# Patient Record
Sex: FEMALE | Race: OTHER | HISPANIC OR LATINO | URBAN - METROPOLITAN AREA
[De-identification: names, ages, dates, MRNs, and addresses within clinical notes are randomized per-mention and may not be internally consistent; named-entity substitution may affect disease eponyms.]

---

## 2022-07-13 ENCOUNTER — INPATIENT (INPATIENT)
Facility: HOSPITAL | Age: 22
LOS: 0 days | Discharge: HOME | DRG: 770 | End: 2022-07-13
Attending: OBSTETRICS & GYNECOLOGY | Admitting: OBSTETRICS & GYNECOLOGY
Payer: MEDICAID

## 2022-07-13 VITALS
OXYGEN SATURATION: 98 % | TEMPERATURE: 98 F | HEART RATE: 65 BPM | SYSTOLIC BLOOD PRESSURE: 114 MMHG | RESPIRATION RATE: 13 BRPM | DIASTOLIC BLOOD PRESSURE: 74 MMHG

## 2022-07-13 VITALS
DIASTOLIC BLOOD PRESSURE: 69 MMHG | SYSTOLIC BLOOD PRESSURE: 121 MMHG | OXYGEN SATURATION: 99 % | RESPIRATION RATE: 18 BRPM | HEIGHT: 65 IN | WEIGHT: 149.91 LBS | TEMPERATURE: 98 F | HEART RATE: 85 BPM

## 2022-07-13 DIAGNOSIS — O03.4 INCOMPLETE SPONTANEOUS ABORTION WITHOUT COMPLICATION: ICD-10-CM

## 2022-07-13 DIAGNOSIS — Z98.890 OTHER SPECIFIED POSTPROCEDURAL STATES: Chronic | ICD-10-CM

## 2022-07-13 LAB
ABO RH CONFIRMATION: SIGNIFICANT CHANGE UP
ALBUMIN SERPL ELPH-MCNC: 3.5 G/DL — SIGNIFICANT CHANGE UP (ref 3.3–5)
ALP SERPL-CCNC: 78 U/L — SIGNIFICANT CHANGE UP (ref 40–120)
ALT FLD-CCNC: 38 U/L — SIGNIFICANT CHANGE UP (ref 12–78)
ANION GAP SERPL CALC-SCNC: 5 MMOL/L — SIGNIFICANT CHANGE UP (ref 5–17)
APTT BLD: 29.9 SEC — SIGNIFICANT CHANGE UP (ref 27.5–35.5)
AST SERPL-CCNC: 15 U/L — SIGNIFICANT CHANGE UP (ref 15–37)
BASOPHILS # BLD AUTO: 0.03 K/UL — SIGNIFICANT CHANGE UP (ref 0–0.2)
BASOPHILS NFR BLD AUTO: 0.5 % — SIGNIFICANT CHANGE UP (ref 0–2)
BILIRUB SERPL-MCNC: 0.3 MG/DL — SIGNIFICANT CHANGE UP (ref 0.2–1.2)
BLD GP AB SCN SERPL QL: SIGNIFICANT CHANGE UP
BUN SERPL-MCNC: 12 MG/DL — SIGNIFICANT CHANGE UP (ref 7–23)
CALCIUM SERPL-MCNC: 9.1 MG/DL — SIGNIFICANT CHANGE UP (ref 8.5–10.1)
CHLORIDE SERPL-SCNC: 108 MMOL/L — SIGNIFICANT CHANGE UP (ref 96–108)
CO2 SERPL-SCNC: 27 MMOL/L — SIGNIFICANT CHANGE UP (ref 22–31)
CREAT SERPL-MCNC: 0.8 MG/DL — SIGNIFICANT CHANGE UP (ref 0.5–1.3)
EGFR: 109 ML/MIN/1.73M2 — SIGNIFICANT CHANGE UP
EOSINOPHIL # BLD AUTO: 0.2 K/UL — SIGNIFICANT CHANGE UP (ref 0–0.5)
EOSINOPHIL NFR BLD AUTO: 3.3 % — SIGNIFICANT CHANGE UP (ref 0–6)
GLUCOSE SERPL-MCNC: 110 MG/DL — HIGH (ref 70–99)
HCG SERPL-ACNC: 394 MIU/ML — HIGH
HCT VFR BLD CALC: 32.9 % — LOW (ref 34.5–45)
HGB BLD-MCNC: 11.4 G/DL — LOW (ref 11.5–15.5)
IMM GRANULOCYTES NFR BLD AUTO: 0.2 % — SIGNIFICANT CHANGE UP (ref 0–1.5)
INR BLD: 1.05 RATIO — SIGNIFICANT CHANGE UP (ref 0.88–1.16)
LIDOCAIN IGE QN: 63 U/L — LOW (ref 73–393)
LYMPHOCYTES # BLD AUTO: 2.88 K/UL — SIGNIFICANT CHANGE UP (ref 1–3.3)
LYMPHOCYTES # BLD AUTO: 47 % — HIGH (ref 13–44)
MCHC RBC-ENTMCNC: 29.8 PG — SIGNIFICANT CHANGE UP (ref 27–34)
MCHC RBC-ENTMCNC: 34.7 GM/DL — SIGNIFICANT CHANGE UP (ref 32–36)
MCV RBC AUTO: 86.1 FL — SIGNIFICANT CHANGE UP (ref 80–100)
MONOCYTES # BLD AUTO: 0.35 K/UL — SIGNIFICANT CHANGE UP (ref 0–0.9)
MONOCYTES NFR BLD AUTO: 5.7 % — SIGNIFICANT CHANGE UP (ref 2–14)
NEUTROPHILS # BLD AUTO: 2.66 K/UL — SIGNIFICANT CHANGE UP (ref 1.8–7.4)
NEUTROPHILS NFR BLD AUTO: 43.3 % — SIGNIFICANT CHANGE UP (ref 43–77)
NRBC # BLD: 0 /100 WBCS — SIGNIFICANT CHANGE UP (ref 0–0)
PLATELET # BLD AUTO: 317 K/UL — SIGNIFICANT CHANGE UP (ref 150–400)
POTASSIUM SERPL-MCNC: 3.8 MMOL/L — SIGNIFICANT CHANGE UP (ref 3.5–5.3)
POTASSIUM SERPL-SCNC: 3.8 MMOL/L — SIGNIFICANT CHANGE UP (ref 3.5–5.3)
PROT SERPL-MCNC: 6.8 G/DL — SIGNIFICANT CHANGE UP (ref 6–8.3)
PROTHROM AB SERPL-ACNC: 12.3 SEC — SIGNIFICANT CHANGE UP (ref 10.5–13.4)
RBC # BLD: 3.82 M/UL — SIGNIFICANT CHANGE UP (ref 3.8–5.2)
RBC # FLD: 12.7 % — SIGNIFICANT CHANGE UP (ref 10.3–14.5)
SARS-COV-2 RNA SPEC QL NAA+PROBE: SIGNIFICANT CHANGE UP
SODIUM SERPL-SCNC: 140 MMOL/L — SIGNIFICANT CHANGE UP (ref 135–145)
WBC # BLD: 6.13 K/UL — SIGNIFICANT CHANGE UP (ref 3.8–10.5)
WBC # FLD AUTO: 6.13 K/UL — SIGNIFICANT CHANGE UP (ref 3.8–10.5)

## 2022-07-13 PROCEDURE — 85025 COMPLETE CBC W/AUTO DIFF WBC: CPT

## 2022-07-13 PROCEDURE — 99285 EMERGENCY DEPT VISIT HI MDM: CPT

## 2022-07-13 PROCEDURE — 84702 CHORIONIC GONADOTROPIN TEST: CPT

## 2022-07-13 PROCEDURE — 88304 TISSUE EXAM BY PATHOLOGIST: CPT

## 2022-07-13 PROCEDURE — 85610 PROTHROMBIN TIME: CPT

## 2022-07-13 PROCEDURE — U0005: CPT

## 2022-07-13 PROCEDURE — 96360 HYDRATION IV INFUSION INIT: CPT

## 2022-07-13 PROCEDURE — 85730 THROMBOPLASTIN TIME PARTIAL: CPT

## 2022-07-13 PROCEDURE — 86900 BLOOD TYPING SEROLOGIC ABO: CPT

## 2022-07-13 PROCEDURE — 83690 ASSAY OF LIPASE: CPT

## 2022-07-13 PROCEDURE — 80053 COMPREHEN METABOLIC PANEL: CPT

## 2022-07-13 PROCEDURE — 36415 COLL VENOUS BLD VENIPUNCTURE: CPT

## 2022-07-13 PROCEDURE — 76830 TRANSVAGINAL US NON-OB: CPT | Mod: 26

## 2022-07-13 PROCEDURE — 86850 RBC ANTIBODY SCREEN: CPT

## 2022-07-13 PROCEDURE — 88304 TISSUE EXAM BY PATHOLOGIST: CPT | Mod: 26

## 2022-07-13 PROCEDURE — U0003: CPT

## 2022-07-13 PROCEDURE — 86901 BLOOD TYPING SEROLOGIC RH(D): CPT

## 2022-07-13 PROCEDURE — 76830 TRANSVAGINAL US NON-OB: CPT

## 2022-07-13 RX ORDER — HYDROMORPHONE HYDROCHLORIDE 2 MG/ML
1 INJECTION INTRAMUSCULAR; INTRAVENOUS; SUBCUTANEOUS
Refills: 0 | Status: DISCONTINUED | OUTPATIENT
Start: 2022-07-13 | End: 2022-07-13

## 2022-07-13 RX ORDER — ACETAMINOPHEN 500 MG
1000 TABLET ORAL ONCE
Refills: 0 | Status: DISCONTINUED | OUTPATIENT
Start: 2022-07-13 | End: 2022-07-13

## 2022-07-13 RX ORDER — SODIUM CHLORIDE 9 MG/ML
1000 INJECTION INTRAMUSCULAR; INTRAVENOUS; SUBCUTANEOUS ONCE
Refills: 0 | Status: COMPLETED | OUTPATIENT
Start: 2022-07-13 | End: 2022-07-13

## 2022-07-13 RX ORDER — ONDANSETRON 8 MG/1
4 TABLET, FILM COATED ORAL ONCE
Refills: 0 | Status: DISCONTINUED | OUTPATIENT
Start: 2022-07-13 | End: 2022-07-13

## 2022-07-13 RX ORDER — HYDROMORPHONE HYDROCHLORIDE 2 MG/ML
0.5 INJECTION INTRAMUSCULAR; INTRAVENOUS; SUBCUTANEOUS
Refills: 0 | Status: DISCONTINUED | OUTPATIENT
Start: 2022-07-13 | End: 2022-07-13

## 2022-07-13 RX ORDER — SODIUM CHLORIDE 9 MG/ML
1000 INJECTION, SOLUTION INTRAVENOUS
Refills: 0 | Status: DISCONTINUED | OUTPATIENT
Start: 2022-07-13 | End: 2022-07-13

## 2022-07-13 RX ADMIN — SODIUM CHLORIDE 1000 MILLILITER(S): 9 INJECTION INTRAMUSCULAR; INTRAVENOUS; SUBCUTANEOUS at 01:46

## 2022-07-13 RX ADMIN — SODIUM CHLORIDE 75 MILLILITER(S): 9 INJECTION, SOLUTION INTRAVENOUS at 14:35

## 2022-07-13 RX ADMIN — SODIUM CHLORIDE 1000 MILLILITER(S): 9 INJECTION INTRAMUSCULAR; INTRAVENOUS; SUBCUTANEOUS at 02:46

## 2022-07-13 RX ADMIN — SODIUM CHLORIDE 500 MILLILITER(S): 9 INJECTION INTRAMUSCULAR; INTRAVENOUS; SUBCUTANEOUS at 06:07

## 2022-07-13 NOTE — ED PROVIDER NOTE - CHPI ED SYMPTOMS NEG
no abdominal pain/no back pain/no discharge/no dysuria/no fever/no nausea/no pain/no vaginal discharge/no vomiting/no chills

## 2022-07-13 NOTE — ED ADULT NURSE NOTE - COMFORT/ACCEPTABLE PAIN LEVEL (0-10)
0 Pt was in for discharge last night at 7pm. As per Dr. Cancino pt had an aide organized to pick him up however as per CDU RN aide came and said he was uncomfortable taking him home in his condition. I personally spoke with pts daughter who lives in [place], she will contact agency in the morning. Pt is pending SW, they are aware of pt and will discuss possible placement in the morning.  Pt resting comfortably without acute events

## 2022-07-13 NOTE — BRIEF OPERATIVE NOTE - NSICDXBRIEFPOSTOP_GEN_ALL_CORE_FT
POST-OP DIAGNOSIS:  Retained products of conception following  2022 14:25:23  Ashleigh Ballesteros

## 2022-07-13 NOTE — H&P ADULT - HISTORY OF PRESENT ILLNESS
HPI:  Pt is a 20yo F with LMP of 22 who presents with heavy vaginal bleeding s/p VTP on 22 at [facility]. Pt states that she was approximately 6wks GA at time of procedure. She was seen for a follow-up visit on 22 and was told that she had 2 blood clots that she would pass on her own. heavy vaginal bleeding with changing a fully soaked pad every 2hrs started last night. Pt also c/o cramping, mild dizziness. Denies F/C/cough, N/V. Pt last ate at 11pm.     MEDICATIONS  (STANDING):    MEDICATIONS  (PRN):      Allergies    amoxicillin (Rash)    Intolerances        PAST MEDICAL & SURGICAL HISTORY:  22 Suction D&C    OB/GYN HISTORY:      FAMILY HISTORY:  N/C    SOCIAL HISTORY:  Denies toxic habits    REVIEW OF SYSTEMS  As per HPI    Vital Signs Last 24 Hrs  T(C): 36.9 (2022 07:40), Max: 36.9 (2022 07:40)  T(F): 98.5 (2022 07:40), Max: 98.5 (2022 07:40)  HR: 70 (2022 07:40) (70 - 86)  BP: 99/63 (2022 07:40) (96/60 - 121/69)  BP(mean): --  RR: 16 (2022 07:40) (16 - 18)  SpO2: 99% (2022 07:40) (99% - 100%)    Parameters below as of 2022 07:40  Patient On (Oxygen Delivery Method): room air      Last Menstrual Period  2022      PHYSICAL EXAM:      Constitutional:  AAOx3, NAD    Genitourinary: As per ED HPI, deferred    LABS:                        11.4   6.13  )-----------( 317      ( 2022 01:30 )             32.9     07-13    140  |  108  |  12  ----------------------------<  110<H>  3.8   |  27  |  0.80    Ca    9.1      2022 01:30    TPro  6.8  /  Alb  3.5  /  TBili  0.3  /  DBili  x   /  AST  15  /  ALT  38  /  AlkPhos  78  -    I&O's Detail    PT/INR - ( 2022 05:35 )   PT: 12.3 sec;   INR: 1.05 ratio         PTT - ( 2022 05:35 )  PTT:29.9 sec      RADIOLOGY & ADDITIONAL STUDIES:    < from: US Transvaginal (22 @ 02:50) >    ACC: [id number] EXAM:  US TRANSVAGINAL                          PROCEDURE DATE:  2022          INTERPRETATION:  CLINICAL INFORMATION: Heavy vaginal bleeding after   elective  on     LMP: 2022    COMPARISON: None available.    TECHNIQUE: Transvaginal images of the pelvis are submitted.    FINDINGS:  Uterus: 7.7 cm x 3.8 cm x 4.5 cm.  Endometrium: 5 mm. Mild focal thickening in the lower uterine segment   with hypervascularity and complex free fluid.  Cervical canal is mildly   distended with blood products.    Right ovary: 3.1 cm x 1.4 cm x 1.8 cm. Within normal limits. Normal flow   is detected in the right ovary.  Left ovary: 3.2 cm x 1.4 cm x 2.9 cm. Within normal limits. A 1.3 cm   corpus luteum cyst.    Fluid: None.    IMPRESSION:    No intrauterine pregnancy is identified.  Endometrial canal in the lower uterine segment is mildly hypervascular   and distended with complex fluid that may represent incomplete .    Follow up to resolution.      --- Endof Report ---            ZINA MALCOLM MD; Attending Radiologist  This document has been electronically signed. 2022  3:47AM    < end of copied text >

## 2022-07-13 NOTE — ASU DISCHARGE PLAN (ADULT/PEDIATRIC) - CARE PROVIDER_API CALL
Ashleigh Ballesteros)  [address], [address] [place] [address]  Phone: [phone]  Fax: [phone]  Follow Up Time:

## 2022-07-13 NOTE — ED PROVIDER NOTE - CLINICAL SUMMARY MEDICAL DECISION MAKING FREE TEXT BOX
21 year old female p/w heavy vaginal bleeding with clots that started yesterday.  Patient had elective  on .  No abdominal pain, fever.  Check labs, hydrate, pelvic US to r/o retained POC

## 2022-07-13 NOTE — ED PROVIDER NOTE - OBJECTIVE STATEMENT
21 year old female with no significant PMH presents with heavy vaginal bleeding.  Patient had an elective  on  at [facility].  Afterwards, she mild moderate abdominal cramping and vaginal bleeding which had mostly resolved a few days ago.  She had a follow up visit yesterday and ultrasound showed 2 small clots in her uterus which she was told would pass shortly.  Patient reports that since then, she has been experiencing heavy vaginal bleeding and passing several large clots.  She saturated 2 pads in the last 1.5 hours.  Denies abdominal pain, dysuria, back pain, n/v/d, dizziness, syncope.  She is  and was approximately 6 weeks pregnant at the time of the procedure. PMD Melva Grayson

## 2022-07-13 NOTE — ASU DISCHARGE PLAN (ADULT/PEDIATRIC) - NS MD DC FALL RISK RISK
For information on Fall & Injury Prevention, visit: [email or web address] OR  [email or web address] OR  https://www.cdc.gov/steadi/patient.html

## 2022-07-13 NOTE — H&P ADULT - ASSESSMENT
A/P: s/p VTP with RPOCs  - Plan for Suction D&C  - Pt informed of risks inclusive of but not limited to infection, bleeding and nearby organ injury  - COVID test, T&S  - PreOp Doxycycline  - Intraop Methergine

## 2022-07-13 NOTE — ED PROVIDER NOTE - GENITOURINARY [-], MLM
[de-identified] : RIGHT EAR RINGING FOR A DAY/ NO HEARING LOSS/ HAS SOME EAR PRESSURE ON THE RIGHT\par SIMILAR EPISODE IN THE PAST 2018\par OTOLOGIC WORK UP HAS BEEN SUGGESTIVE OF MENIERS DISEASE LEFT EAR THEN\par NOW IS RIGHT EAR 
no difficulty urinating/no dysuria/no hematuria/no dysmenorrhea/no pelvic pain/no STD exposure

## 2022-07-13 NOTE — ASU DISCHARGE PLAN (ADULT/PEDIATRIC) - CALL YOUR DOCTOR IF YOU HAVE ANY OF THE FOLLOWING:
Fever greater than (need to indicate Fahrenheit or Celsius)/Wound/Surgical Site with redness, or foul smelling discharge or pus/Nausea and vomiting that does not stop/Unable to urinate/Inability to tolerate liquids or foods

## 2022-07-13 NOTE — ED ADULT TRIAGE NOTE - CHIEF COMPLAINT QUOTE
Pt had an  on  at [facility], sts everything was fine after had moderate vaginal bleeding, went for checkup yesterday was told everything looked ok, but was noted with a large blood clot that needed to pass, pt sts bleeding became worse yesterday and is passing a lot of big clots.

## 2022-07-13 NOTE — BRIEF OPERATIVE NOTE - NSICDXBRIEFPROCEDURE_GEN_ALL_CORE_FT
PROCEDURES:  Dilation and curettage of uterus using suction for incomplete  2022 14:24:31  Ashleigh Ballesteros

## 2022-07-13 NOTE — ED PROVIDER NOTE - NOTES
Patient may need a D&C, Dr. Ballesteros will be able to do it later today.  If patient does not wish to stay in ED, she can go home to attempt to pass the products

## 2022-07-13 NOTE — ASU PREOP CHECKLIST - TEMPERATURE IN FAHRENHEIT (DEGREES F)
----- Message from Sean Murillo MD sent at 6/8/2017  9:24 PM CDT -----  Let patient know tests are normal      
Received: Yesterday       MD BALDO Hu P Cardio Nurse Msg Pool                     Let patient know tests are normal                   Pulmonary Function Test   Status:  Final result   Visible to patient:  No (Not Released) Order: [id number]       Notes Recorded by Sean Murillo MD on 6/8/2017 at 9:24 PM  Let patient know tests are normal           Narrative        Result approved by Art Darling DO on 6/8/17         Impression        PULMONARY FUNCTION TEST INTERPRETATION REPORT    DATE OF SERVICE:  06/02/2017.    REQUESTING PROVIDER:  Dr. Sean Murillo.    INDICATION:  Pulmonary hypertension.    FINDINGS:  Baseline spirometry and flow volume loop demonstrate FEV1/FVC ratio of 72, FEV1 of 114%, FVC of 116% of predicted.  Flow volume loop appears normal.  There was no administration of an inhaled bronchodilator.  Maximum ventilatory volume is 100% of predicted.  Lung volume testing demonstrates a total lung capacity of 106%, residual volume of 87% and an ERV at 119% of predicted.  Airway resistance testing is normal.  Diffusion capacity testing is normal.  Resting room air pulse oximetry is 98%.  There is no prior testing available for comparison.    INTERPRETATION:  Normal spirometry and flow volume loop, normal maximum ventilatory volumes, normal lung volume testing, normal diffusion capacity testing, normal airway resistance testing, normal resting room air pulse oximetry.    Interpreted by: Art Darling DO    ([id number])  DD: 06/07/2017 09:21:51 AM CDT  DT: 06/07/2017 02:16:24 PM CDT            Last Resulted: 06/08/17  7:53 AM                        Scans on Order [id number]          Scan on 6/6/2017 12:47 PM by Maryjane Garzon : Pulmonary Function Test                      Called patient and let her know of normal PFT. She verbalized understanding and was thankful for services provided.  
98.2

## 2022-12-04 NOTE — ASU PREOP CHECKLIST - ORDERS/MEDICATION ADMINISTRATION RECORD ON CHART
I attest my time as attending is greater than 50% of the total combined time spent on qualifying patient care activities by the PA/NP and attending.
I independently performed the documented:
done

## 2024-01-25 NOTE — ED ADULT NURSE REASSESSMENT NOTE - PAIN: RESPONSE TO INTERVENTIONS
Last Visit Date: 10/18/2023   Next Visit Date: 1/29/2024     
absence of nonverbal indicators of pain

## (undated) DEVICE — PACK LITHOTOMY

## (undated) DEVICE — WARMING BLANKET UPPER ADULT

## (undated) DEVICE — GLV 6 PROTEXIS (WHITE)

## (undated) DEVICE — VACUUM CURETTE BERKLEY OLYMPUS CURVED 7MM

## (undated) DEVICE — CANISTER SUCTION 2000CC

## (undated) DEVICE — SOL IRR POUR NS 0.9% 1000ML

## (undated) DEVICE — GOWN XL W TOWEL

## (undated) DEVICE — CONTAINER SPECIMEN 4OZ

## (undated) DEVICE — CURETTE UTERINE VACURETTE CURVED 8MM

## (undated) DEVICE — VENODYNE/SCD SLEEVE CALF LARGE

## (undated) DEVICE — VACUUM CURETTE BERKLEY OLYMPUS F TIP 6MM

## (undated) DEVICE — TUBING GYRUS ACMI COLLECTION SET 6FT

## (undated) DEVICE — VENODYNE/SCD SLEEVE CALF MEDIUM

## (undated) DEVICE — SOL IRR POUR H2O 1000ML

## (undated) DEVICE — TUBING SUCTION ASPIRATION 8FT

## (undated) DEVICE — GLV 6.5 PROTEXIS (BLUE)

## (undated) DEVICE — PSP-SCD MACHINE: Type: DURABLE MEDICAL EQUIPMENT